# Patient Record
Sex: MALE | Race: WHITE | NOT HISPANIC OR LATINO | Employment: FULL TIME | URBAN - METROPOLITAN AREA
[De-identification: names, ages, dates, MRNs, and addresses within clinical notes are randomized per-mention and may not be internally consistent; named-entity substitution may affect disease eponyms.]

---

## 2023-10-31 ENCOUNTER — OFFICE VISIT (OUTPATIENT)
Dept: MEDICAL GROUP | Facility: LAB | Age: 43
End: 2023-10-31
Payer: COMMERCIAL

## 2023-10-31 VITALS
WEIGHT: 140.2 LBS | TEMPERATURE: 97.1 F | SYSTOLIC BLOOD PRESSURE: 104 MMHG | HEART RATE: 60 BPM | RESPIRATION RATE: 14 BRPM | DIASTOLIC BLOOD PRESSURE: 62 MMHG | BODY MASS INDEX: 18.99 KG/M2 | OXYGEN SATURATION: 98 % | HEIGHT: 72 IN

## 2023-10-31 DIAGNOSIS — M06.9 RHEUMATOID ARTHRITIS INVOLVING MULTIPLE SITES, UNSPECIFIED WHETHER RHEUMATOID FACTOR PRESENT (HCC): ICD-10-CM

## 2023-10-31 DIAGNOSIS — Z00.00 PREVENTATIVE HEALTH CARE: ICD-10-CM

## 2023-10-31 PROCEDURE — 99204 OFFICE O/P NEW MOD 45 MIN: CPT | Performed by: NURSE PRACTITIONER

## 2023-10-31 PROCEDURE — 3078F DIAST BP <80 MM HG: CPT | Performed by: NURSE PRACTITIONER

## 2023-10-31 PROCEDURE — 3074F SYST BP LT 130 MM HG: CPT | Performed by: NURSE PRACTITIONER

## 2023-10-31 ASSESSMENT — PATIENT HEALTH QUESTIONNAIRE - PHQ9: CLINICAL INTERPRETATION OF PHQ2 SCORE: 0

## 2023-10-31 NOTE — ASSESSMENT & PLAN NOTE
Patient has a history of RA, was followed by rheumatology in the past in [place]. He was previously on plaquenil, but has not been on this medication in about 10 years. RA has been well controlled, but recently noticed that he has started having flares of joint pain in ankles, fingers, shoulders, feet. Right ankle pain is the worst right now after an injury about 3 months ago. He would like labs and to re-establish with rheumatology.

## 2023-10-31 NOTE — PROGRESS NOTES
Subjective:     CC:    Chief Complaint   Patient presents with    Establish Care    Referral Needed     Rheumatologist      HISTORY OF THE PRESENT ILLNESS: Patient is a 43 y.o. male. This pleasant patient is here today to establish care and discuss the following:    Rheumatoid arthritis (HCC)  Patient has a history of RA, was followed by rheumatology in the past in [place]. He was previously on plaquenil, but has not been on this medication in about 10 years. RA has been well controlled, but recently noticed that he has started having flares of joint pain in ankles, fingers, shoulders, feet. Right ankle pain is the worst right now after an injury about 3 months ago. He would like labs and to re-establish with rheumatology.     ROS:   As documented in history of present illness above      Objective:     Exam: /62 (BP Location: Right arm, Patient Position: Sitting, BP Cuff Size: Adult)   Pulse 60   Temp 36.2 °C (97.1 °F)   Resp 14   Ht 1.829 m (6')   Wt 63.6 kg (140 lb 3.2 oz)   SpO2 98%  Body mass index is 19.01 kg/m².    Constitutional: Alert, no distress, well-groomed.  Skin: Warm, dry, good turgor, no rashes in visible areas.  Eye: Equal, round and reactive, conjunctiva clear, lids normal.  ENMT: Lips without lesions, good dentition, moist mucous membranes.  Neck: Trachea midline, no masses, no thyromegaly.  Respiratory: Unlabored respiratory effort, no cough.  MSK: Normal gait, moves all extremities.  Neuro: Grossly non-focal.   Psych: Alert and oriented x3, normal affect and mood.    Assessment & Plan:   43 y.o. male with the following -    1. Rheumatoid arthritis involving multiple sites, unspecified whether rheumatoid factor present (HCC)  Chronic, uncontrolled condition. X-rays and labs ordered. Will place referral when labs/imaging return. Discussed additional imaging for ankle in the future if pain persists/worsens.   - RHEUMATOID ARTHRITIS PANEL; Future  - DX-JOINT SURVEY-HANDS SINGLE VIEW;  Future  - DX-JOINT SURVEY-FEET SINGLE VIEW; Future  - DX-ANKLE 3+ VIEWS RIGHT; Future  - CRP QUANTITIVE (NON-CARDIAC); Future  - Sed Rate; Future    2. Preventative health care  Labs ordered.   - CBC WITH DIFFERENTIAL; Future  - Comp Metabolic Panel; Future  - Lipid Profile; Future  - HEMOGLOBIN A1C; Future  - TSH WITH REFLEX TO FT4; Future    Please note that this dictation was created using voice recognition software. I have made every reasonable attempt to correct obvious errors, but I expect that there are errors of grammar and possibly content that I did not discover before finalizing the note.

## 2023-11-14 ENCOUNTER — APPOINTMENT (OUTPATIENT)
Dept: RADIOLOGY | Facility: MEDICAL CENTER | Age: 43
End: 2023-11-14
Attending: NURSE PRACTITIONER
Payer: COMMERCIAL

## 2023-11-14 DIAGNOSIS — M06.9 RHEUMATOID ARTHRITIS INVOLVING MULTIPLE SITES, UNSPECIFIED WHETHER RHEUMATOID FACTOR PRESENT (HCC): ICD-10-CM

## 2023-11-14 PROCEDURE — 73610 X-RAY EXAM OF ANKLE: CPT | Mod: RT

## 2023-11-14 PROCEDURE — 77077 JOINT SURVEY SINGLE VIEW: CPT

## 2023-11-16 ENCOUNTER — TELEPHONE (OUTPATIENT)
Dept: SCHEDULING | Facility: IMAGING CENTER | Age: 43
End: 2023-11-16
Payer: COMMERCIAL

## 2023-11-20 ENCOUNTER — HOSPITAL ENCOUNTER (OUTPATIENT)
Dept: LAB | Facility: MEDICAL CENTER | Age: 43
End: 2023-11-20
Attending: NURSE PRACTITIONER
Payer: COMMERCIAL

## 2023-11-20 DIAGNOSIS — M06.9 RHEUMATOID ARTHRITIS INVOLVING MULTIPLE SITES, UNSPECIFIED WHETHER RHEUMATOID FACTOR PRESENT (HCC): ICD-10-CM

## 2023-11-20 DIAGNOSIS — Z00.00 PREVENTATIVE HEALTH CARE: ICD-10-CM

## 2023-11-20 LAB
ALBUMIN SERPL BCP-MCNC: 4.3 G/DL (ref 3.2–4.9)
ALBUMIN/GLOB SERPL: 1.5 G/DL
ALP SERPL-CCNC: 65 U/L (ref 30–99)
ALT SERPL-CCNC: 14 U/L (ref 2–50)
ANION GAP SERPL CALC-SCNC: 10 MMOL/L (ref 7–16)
AST SERPL-CCNC: 18 U/L (ref 12–45)
BASOPHILS # BLD AUTO: 0.3 % (ref 0–1.8)
BASOPHILS # BLD: 0.03 K/UL (ref 0–0.12)
BILIRUB SERPL-MCNC: 0.5 MG/DL (ref 0.1–1.5)
BUN SERPL-MCNC: 15 MG/DL (ref 8–22)
CALCIUM ALBUM COR SERPL-MCNC: 9.1 MG/DL (ref 8.5–10.5)
CALCIUM SERPL-MCNC: 9.3 MG/DL (ref 8.5–10.5)
CHLORIDE SERPL-SCNC: 106 MMOL/L (ref 96–112)
CHOLEST SERPL-MCNC: 156 MG/DL (ref 100–199)
CO2 SERPL-SCNC: 25 MMOL/L (ref 20–33)
CREAT SERPL-MCNC: 0.82 MG/DL (ref 0.5–1.4)
CRP SERPL HS-MCNC: <0.3 MG/DL (ref 0–0.75)
EOSINOPHIL # BLD AUTO: 0.08 K/UL (ref 0–0.51)
EOSINOPHIL NFR BLD: 0.9 % (ref 0–6.9)
ERYTHROCYTE [DISTWIDTH] IN BLOOD BY AUTOMATED COUNT: 40.3 FL (ref 35.9–50)
ERYTHROCYTE [SEDIMENTATION RATE] IN BLOOD BY WESTERGREN METHOD: 5 MM/HOUR (ref 0–20)
EST. AVERAGE GLUCOSE BLD GHB EST-MCNC: 103 MG/DL
FASTING STATUS PATIENT QL REPORTED: NORMAL
GFR SERPLBLD CREATININE-BSD FMLA CKD-EPI: 113 ML/MIN/1.73 M 2
GLOBULIN SER CALC-MCNC: 2.9 G/DL (ref 1.9–3.5)
GLUCOSE SERPL-MCNC: 91 MG/DL (ref 65–99)
HBA1C MFR BLD: 5.2 % (ref 4–5.6)
HCT VFR BLD AUTO: 48.5 % (ref 42–52)
HDLC SERPL-MCNC: 51 MG/DL
HGB BLD-MCNC: 17.3 G/DL (ref 14–18)
IMM GRANULOCYTES # BLD AUTO: 0.03 K/UL (ref 0–0.11)
IMM GRANULOCYTES NFR BLD AUTO: 0.3 % (ref 0–0.9)
LDLC SERPL CALC-MCNC: 86 MG/DL
LYMPHOCYTES # BLD AUTO: 1.58 K/UL (ref 1–4.8)
LYMPHOCYTES NFR BLD: 17.6 % (ref 22–41)
MCH RBC QN AUTO: 31.7 PG (ref 27–33)
MCHC RBC AUTO-ENTMCNC: 35.7 G/DL (ref 32.3–36.5)
MCV RBC AUTO: 88.8 FL (ref 81.4–97.8)
MONOCYTES # BLD AUTO: 0.71 K/UL (ref 0–0.85)
MONOCYTES NFR BLD AUTO: 7.9 % (ref 0–13.4)
NEUTROPHILS # BLD AUTO: 6.54 K/UL (ref 1.82–7.42)
NEUTROPHILS NFR BLD: 73 % (ref 44–72)
NRBC # BLD AUTO: 0 K/UL
NRBC BLD-RTO: 0 /100 WBC (ref 0–0.2)
PLATELET # BLD AUTO: 187 K/UL (ref 164–446)
PMV BLD AUTO: 12.6 FL (ref 9–12.9)
POTASSIUM SERPL-SCNC: 3.7 MMOL/L (ref 3.6–5.5)
PROT SERPL-MCNC: 7.2 G/DL (ref 6–8.2)
RBC # BLD AUTO: 5.46 M/UL (ref 4.7–6.1)
SODIUM SERPL-SCNC: 141 MMOL/L (ref 135–145)
TRIGL SERPL-MCNC: 96 MG/DL (ref 0–149)
TSH SERPL DL<=0.005 MIU/L-ACNC: 4.75 UIU/ML (ref 0.38–5.33)
WBC # BLD AUTO: 9 K/UL (ref 4.8–10.8)

## 2023-11-20 PROCEDURE — 36415 COLL VENOUS BLD VENIPUNCTURE: CPT

## 2023-11-20 PROCEDURE — 83036 HEMOGLOBIN GLYCOSYLATED A1C: CPT

## 2023-11-20 PROCEDURE — 80053 COMPREHEN METABOLIC PANEL: CPT

## 2023-11-20 PROCEDURE — 86200 CCP ANTIBODY: CPT

## 2023-11-20 PROCEDURE — 84443 ASSAY THYROID STIM HORMONE: CPT

## 2023-11-20 PROCEDURE — 86431 RHEUMATOID FACTOR QUANT: CPT

## 2023-11-20 PROCEDURE — 86140 C-REACTIVE PROTEIN: CPT

## 2023-11-20 PROCEDURE — 85025 COMPLETE CBC W/AUTO DIFF WBC: CPT

## 2023-11-20 PROCEDURE — 80061 LIPID PANEL: CPT

## 2023-11-20 PROCEDURE — 83516 IMMUNOASSAY NONANTIBODY: CPT

## 2023-11-20 PROCEDURE — 85652 RBC SED RATE AUTOMATED: CPT

## 2023-11-23 LAB
CARBAMYLATED PROTEIN ANTIBODY, IGG Q6043: 8 UNITS (ref 0–19)
CCP IGA+IGG SERPL IA-ACNC: 127 UNITS (ref 0–19)
RHEUMATOID FACT SER NEPH-ACNC: 55 IU/ML (ref 0–14)

## 2023-11-27 DIAGNOSIS — M06.9 RHEUMATOID ARTHRITIS INVOLVING MULTIPLE SITES, UNSPECIFIED WHETHER RHEUMATOID FACTOR PRESENT (HCC): ICD-10-CM

## 2024-01-15 ASSESSMENT — RHEUMATOLOGY NEW PATIENT QUESTIONNAIRE
JOINT PAIN: BETTER WITH ACTIVITY
LIST CURRENT PROBLEMS: RA
DURATION: >1 HOUR
JOINT SWELLING: Y
MARK ALL THE AREAS OF PAIN: 95816048
CHARACTERISTIC: BETTER WITH ACTIVITY
RATE_1TO10: 6

## 2024-01-18 ENCOUNTER — OFFICE VISIT (OUTPATIENT)
Dept: RHEUMATOLOGY | Facility: MEDICAL CENTER | Age: 44
End: 2024-01-18
Attending: STUDENT IN AN ORGANIZED HEALTH CARE EDUCATION/TRAINING PROGRAM
Payer: COMMERCIAL

## 2024-01-18 VITALS
WEIGHT: 143 LBS | DIASTOLIC BLOOD PRESSURE: 84 MMHG | HEART RATE: 50 BPM | OXYGEN SATURATION: 99 % | SYSTOLIC BLOOD PRESSURE: 110 MMHG | TEMPERATURE: 98.2 F | BODY MASS INDEX: 20.02 KG/M2 | HEIGHT: 71 IN

## 2024-01-18 DIAGNOSIS — Z79.899 LONG-TERM USE OF HYDROXYCHLOROQUINE: ICD-10-CM

## 2024-01-18 DIAGNOSIS — M25.50 ARTHRALGIA OF MULTIPLE JOINTS: ICD-10-CM

## 2024-01-18 DIAGNOSIS — M05.9 SEROPOSITIVE RHEUMATOID ARTHRITIS (HCC): ICD-10-CM

## 2024-01-18 PROBLEM — D84.9 IMMUNOSUPPRESSED STATUS (HCC): Status: ACTIVE | Noted: 2024-01-18

## 2024-01-18 PROCEDURE — 3074F SYST BP LT 130 MM HG: CPT | Performed by: STUDENT IN AN ORGANIZED HEALTH CARE EDUCATION/TRAINING PROGRAM

## 2024-01-18 PROCEDURE — 99214 OFFICE O/P EST MOD 30 MIN: CPT | Performed by: STUDENT IN AN ORGANIZED HEALTH CARE EDUCATION/TRAINING PROGRAM

## 2024-01-18 PROCEDURE — 99211 OFF/OP EST MAY X REQ PHY/QHP: CPT | Performed by: STUDENT IN AN ORGANIZED HEALTH CARE EDUCATION/TRAINING PROGRAM

## 2024-01-18 PROCEDURE — 3079F DIAST BP 80-89 MM HG: CPT | Performed by: STUDENT IN AN ORGANIZED HEALTH CARE EDUCATION/TRAINING PROGRAM

## 2024-01-18 RX ORDER — HYDROXYCHLOROQUINE SULFATE 200 MG/1
200 TABLET, FILM COATED ORAL DAILY
Qty: 90 TABLET | Refills: 3 | Status: SHIPPED | OUTPATIENT
Start: 2024-01-18

## 2024-01-18 RX ORDER — COVID-19 ANTIGEN TEST
220 KIT MISCELLANEOUS
COMMUNITY

## 2024-01-18 ASSESSMENT — PATIENT HEALTH QUESTIONNAIRE - PHQ9: CLINICAL INTERPRETATION OF PHQ2 SCORE: 0

## 2024-01-18 ASSESSMENT — FIBROSIS 4 INDEX: FIB4 SCORE: 1.08

## 2024-01-18 NOTE — PROGRESS NOTES
[facility]  [address], [address], [place] [address]  Phone: [phone] ? Fax: [phone]  [facility]/Health-Services/Rheumatology    NEW PATIENT VISIT NOTE      DATE OF SERVICE: 01/18/2024         Subjective     REFERRING PRACTITIONER:  TRUPTI Obando  [address]  [place] [id number]    PATIENT IDENTIFICATION:  Maye Caraballo  [address] [place] [address]    YOB: 1980    MEDICAL RECORD NUMBER: [id number]         CHIEF COMPLAINT:   Chief Complaint   Patient presents with    New Patient     Rheumatoid arthritis       HISTORY OF PRESENT ILLNESS:  Maye Caraballo is a 44 y.o. male with pertinent history notable for seropositive rheumatoid arthritis diagnosed around late 2000's by a rheumatologist in [place] where he used to live. He presents to establish care for continued evaluation and management of his condition in light of recent symptoms. He had been treated with hydroxychloroquine for years until he self-discontinued it when him and his wife were trying to start a family. He had been doing okay until summer 2023 when he started developing joint pain/swelling mostly in his hands (MCP, PIP, DIP joints) and shoulders, but also his right ankle and right knee (attributes this to overuse), and over 1 hour of morning stiffness that improves with activity. He has been managing with naproxen as needed which does provide some relief. Reports other aspects of his symptoms and medical history as noted on the questionnaire below.    Pertinent treatments: Naproxen 220 mg PRN (helpful), hydroxychloroquine 200 mg daily (self-discontinued after years, restarted 1/18/24-present).    Pertinent positive labs: Positive anti- and RF 55 (11/2023).    Pertinent negative labs: Negative CRP, ESR, TSH, eGFR, creatinine, LFTs, and CBC (in 11/2023).    Pertinent XR imaging (in 11/2023): Hands, feet, and ankles with no evidence of inflammatory or degenerative  arthropathy.    [facility] New Patient History Form    1/15/2024 12:49 PM PST - Filed by Patient   Demographic Information   Marital Status:    Occupation: Physician   Highest Level of Education: Doctorate   MAIN REASON FOR VISIT Discuss RA treatments   HISTORY OF PRESENT ILLNESS   Date of symptom onset: Over 15 years ago, started again 6 months ago   Preceding incident/ailment: Unknown   Describe/list your symptoms: Swelling and stiffness primarily in hands and shoulders   Exacerbating factors: Poor sleep and every morning   Alleviating factors: NSAIDS, exercise   Helpful medications: Naproxen sodium   Ineffective medications:    Severity of pain (scale of 1-10): 6   Personal/emotional stressors:    Jb All The Areas Of Pain    REVIEW OF SYMPTOMS    General   Fevers    Chills    Night sweats    Malaise    Fatigue    Unintentional weight loss    Musculoskeletal   Joint pain Better with activity   Morning stiffness duration >1 hour   Morning stiffness characteristic Better with activity   Joint swelling Yes   Joint instability    Tendon pain    Muscle pain    Body aches    Dermatologic   Hair loss with bald spots    Hair shedding    Skin thickening    Skin plaques    Sunlight-induced skin rash    Cold-induced color changes (white, purple, red on rewarming)    Neurologic/Psychiatric   Weakness    Spasms    Tingling    Burning    Numbness    Insomnia    Anxiety    Depression    Head/Eyes   Headaches    Temple pain    Dizziness    Dry eyes    Eye pain    Eye redness    Blurry vision    Vision loss    Ears/Nose   Ear pain    Ringing in ears    Vertigo    Hearing loss    Nasal ulcers    Nosebleeds    Sinus pain    Nasal congestion    Snoring    Mouth/Throat   Oral ulcers    Bleeding gums    Dry mouth    Cavities    Sore throat    Sticking in throat    Difficulty speaking    Neck/Lymphatics   Thyroid pain    Thyroid swelling    Lymph node swelling    Cardiac/Respiratory   Chest pain with breathing    Dry  cough    Cough with bloody phlegm    Shortness of breath    Fast heartbeats    Irregular heartbeats    Gastrointestinal   Nausea    Vomiting    Difficulty swallowing    Heartburn    Abdominal pain    Bloody stool    Mucus stool    Genitourinary   Pelvic pain    Genital ulcers    Abnormal discharge    Burning urination    Frothy urine    Blood in urine    MEDICAL/PERSONAL HISTORY DETAILS   Current Medical Problems: RA   Past/Resolved Medical Problems:    Surgeries/Procedures (include month/year): None   Prescription/Over-The-Counter/Herbal Medications: Tumeric, B complex vitamins, vit D   Medication/Food/Material Allergies (include reactions):    Immunizations (include month/year) Up to date   Alcohol/Tobacco/Recreational Drugs: None   Family Medical History (father, mother, siblings only):      REVIEW OF SYSTEMS:  Except as noted in the history above, relevant review of systems with emphasis on autoimmune rheumatic diseases was otherwise negative.      ACTIVE PROBLEM LIST:  Patient Active Problem List    Diagnosis Date Noted    Arthralgia of multiple joints 01/18/2024    Long-term use of hydroxychloroquine 01/18/2024    Seropositive rheumatoid arthritis (HCC) 10/31/2023       PAST MEDICAL HISTORY:  History reviewed. No pertinent past medical history.    PAST SURGICAL HISTORY:  History reviewed. No pertinent surgical history.    MEDICATIONS:  Current Outpatient Medications   Medication Sig    Naproxen Sodium 220 MG Cap Take 220 mg by mouth.    Cholecalciferol (VITAMIN D3) 50 MCG (2000 UT) Chew Tab Chew.    B Complex Vitamins (VITAMIN B COMPLEX PO) Take  by mouth.    TURMERIC PO Take  by mouth.    hydroxychloroquine (PLAQUENIL) 200 MG Tab Take 1 Tablet by mouth every day.       ALLERGIES:   No Known Allergies    IMMUNIZATIONS:  Immunization History   Administered Date(s) Administered    MODERNA SARS-COV-2 VACCINE (12+) 08/17/2021, 09/14/2021       SOCIAL HISTORY:   Social History     Socioeconomic History    Marital  "status:    Tobacco Use    Smoking status: Never    Smokeless tobacco: Never   Substance and Sexual Activity    Alcohol use: Not Currently    Drug use: Not Currently    Sexual activity: Yes     Partners: Female       FAMILY HISTORY:  History reviewed. No pertinent family history.         Objective     Vital Signs: /84 (BP Location: Left arm, Patient Position: Sitting, BP Cuff Size: Adult)   Pulse (!) 50   Temp 36.8 °C (98.2 °F) (Temporal)   Ht 1.803 m (5' 11\")   Wt 64.9 kg (143 lb)   SpO2 99% Body mass index is 19.94 kg/m².    General: Appears well and comfortable  Eyes: No scleral or conjunctival lesions  ENT: No apparent oral or nasal lesions  Head/Neck: No apparent scalp or neck lesions  Cardiovascular: Regular rate and rhythm; no pericardial rubs  Respiratory: Breathing quiet and unlabored; no rales or pleural rubs  Gastrointestinal: No apparent organomegaly or abdominal masses  Integumentary: No significant cutaneous lesions or dyspigmentation  Musculoskeletal: Poorly localized minimal tenderness of hands and shoulders; no periarticular soft tissue swelling, warmth, erythema, or overt synovitis; no significant restriction in range of motion of joints examined  Neurologic: No focal sensory or motor deficits  Psychiatric: Mood and affect appropriate      LABORATORY RESULTS REVIEWED AND INTERPRETED BY ME:  Lab Results   Component Value Date/Time    CREACTPROT <0.30 11/20/2023 06:56 AM    SEDRATEWES 5 11/20/2023 06:56 AM     Lab Results   Component Value Date/Time    RHEUMFACTN 55 (H) 11/20/2023 06:56 AM     Lab Results   Component Value Date/Time    TSHULTRASEN 4.750 11/20/2023 06:56 AM     Lab Results   Component Value Date/Time    WBC 9.0 11/20/2023 06:56 AM    RBC 5.46 11/20/2023 06:56 AM    HEMOGLOBIN 17.3 11/20/2023 06:56 AM    HEMATOCRIT 48.5 11/20/2023 06:56 AM    MCV 88.8 11/20/2023 06:56 AM    MCH 31.7 11/20/2023 06:56 AM    MCHC 35.7 11/20/2023 06:56 AM    RDW 40.3 11/20/2023 06:56 AM "    PLATELETCT 187 11/20/2023 06:56 AM    MPV 12.6 11/20/2023 06:56 AM    NEUTS 6.54 11/20/2023 06:56 AM    LYMPHOCYTES 17.60 (L) 11/20/2023 06:56 AM    MONOCYTES 7.90 11/20/2023 06:56 AM    EOSINOPHILS 0.90 11/20/2023 06:56 AM    BASOPHILS 0.30 11/20/2023 06:56 AM     Lab Results   Component Value Date/Time    ASTSGOT 18 11/20/2023 06:56 AM    ALTSGPT 14 11/20/2023 06:56 AM    ALKPHOSPHAT 65 11/20/2023 06:56 AM    TBILIRUBIN 0.5 11/20/2023 06:56 AM    TOTPROTEIN 7.2 11/20/2023 06:56 AM    ALBUMIN 4.3 11/20/2023 06:56 AM     Lab Results   Component Value Date/Time    SODIUM 141 11/20/2023 06:56 AM    POTASSIUM 3.7 11/20/2023 06:56 AM    CHLORIDE 106 11/20/2023 06:56 AM    CO2 25 11/20/2023 06:56 AM    GLUCOSE 91 11/20/2023 06:56 AM    BUN 15 11/20/2023 06:56 AM    CREATININE 0.82 11/20/2023 06:56 AM    CALCIUM 9.3 11/20/2023 06:56 AM     Lab Results   Component Value Date/Time    CHOLSTRLTOT 156 11/20/2023 06:56 AM    LDL 86 11/20/2023 06:56 AM    HDL 51 11/20/2023 06:56 AM    TRIGLYCERIDE 96 11/20/2023 06:56 AM    HBA1C 5.2 11/20/2023 06:56 AM       RADIOLOGY RESULTS REVIEWED AND INTERPRETED BY ME:  Results for orders placed in visit on 11/14/23    DX-ANKLE 3+ VIEWS RIGHT    Impression  No acute processes.    Results for orders placed in visit on 11/14/23    DX-JOINT SURVEY-FEET SINGLE VIEW    Impression  Normal exam.    Results for orders placed in visit on 11/14/23    DX-JOINT SURVEY-HANDS SINGLE VIEW    Impression  Normal exam.      All relevant laboratory and imaging results reported on this note were reviewed and interpreted by me.         Assessment & Plan     Maye Caraballo is a 44 y.o. male with history as noted above whose presentation merits the following clinical impressions and recommendations:    1. Seropositive rheumatoid arthritis (HCC)  Clinically relatively active on the current regimen of naproxen which is not disease modifying. Notably, the presence of strongly positive anti-CCP in addition to  RF is associated with risk of more aggressive disease with joint erosions and extra-articular involvement over the long-term if not adequately treated. To prevent further disease evolution and progression, need to reinitiate immunomodulatory therapy with hydroxychloroquine.   - CRP QUANTITIVE (NON-CARDIAC); Future  - Sed Rate; Future  - hydroxychloroquine (PLAQUENIL) 200 MG Tab; Take 1 Tablet by mouth every day.  Dispense: 90 Tablet; Refill: 3    2. Arthralgia of multiple joints  Predominantly inflammatory arthralgia secondary to his RA, but also with some component of biomechanical arthralgia of his right ankle and right knee related to his physical activity.  - CRP QUANTITIVE (NON-CARDIAC); Future  - Sed Rate; Future    3. Long-term use of hydroxychloroquine  Minimal to no risk of retinal toxicity given the low dose of hydroxychloroquine prescribed (less than 5 mg/kg of body weight) per rheumatology and ophthalmology guidelines. However, ophthalmologic evaluation is still recommended with the frequency of routine follow-up eye exams determined by the ophthalmologist, typically annually or every other year.  - CBC WITH DIFFERENTIAL; Future  - Comp Metabolic Panel; Future  - Routine ophthalmology evaluation as recommended      The above assessment and plan were discussed with the patient who acknowledged understanding of the plan.    FOLLOW-UP: Return in about 3 months (around 4/18/2024) for Short.         Thank you for the opportunity to participate in the care of Maye Caraballo.    Marty Ramachandran MD, MS, FACR  Rheumatologist, [facility] ? [facility]   of Clinical Medicine, Department of Internal Medicine  Critical access hospital ? [facility]

## 2024-01-19 NOTE — PATIENT INSTRUCTIONS
AFTER VISIT INSTRUCTIONS    Below are important information to help you navigate your healthcare needs and help us serve you safely and effectively:  If laboratory tests and/or imaging studies were ordered, remember to go get them done as instructed.  If new prescriptions and/or refills were sent, remember to go pick them up from your local pharmacy, or call the specialty pharmacy to request shipment.  Always take your prescription medications exactly as prescribed unless instructed otherwise.  Note that antirheumatic drugs and steroids are immunosuppressive which means they increase your risk of infections and have multiple potential adverse effects on various organ systems in your body, though most of them are uncommon.  It is important that you are up-to-date on age-appropriate immunizations, particularly shingles and bacterial/viral pneumonia vaccines, which you can request from me or your primary care provider.  Be sure to read the drug package inserts to learn about the potential side effects of your medications before you start taking them.  If you experience any significant drug side effects, stop taking the medication and notify me promptly, and depending on the severity of the side effects, consider going to an urgent care or emergency department for immediate attention.  If there are significant findings on your lab tests and imaging studies that warrant further action, I will notify you with explanations via [employer] or phone call, otherwise you can view them on [employer] and let me know if you have any questions.  Note that [employer] messages are typically read during office hours and may take 1-7 business days before a response depending on the urgency of the situation and how busy my clinic schedule is.  In general, [employer] messaging is for non-urgent matters that do not require immediate attention, so for urgent matters that cannot wait, you are advised to go to an urgent care.  Lastly, you are granted  Stephan[employer] access to my documentation of your visit and are encouraged to read my note which details my assessment and plan for your condition.  To learn more about your condition and rheumatic diseases evaluated and treated by rheumatologists, as well as gain access to many helpful resources about these diseases, visit our website at [email or web address].

## 2024-03-22 ENCOUNTER — HOSPITAL ENCOUNTER (OUTPATIENT)
Dept: LAB | Facility: MEDICAL CENTER | Age: 44
End: 2024-03-22
Attending: PAIN MEDICINE
Payer: COMMERCIAL

## 2024-03-22 ENCOUNTER — HOSPITAL ENCOUNTER (OUTPATIENT)
Dept: CARDIOLOGY | Facility: MEDICAL CENTER | Age: 44
End: 2024-03-22
Attending: PAIN MEDICINE
Payer: COMMERCIAL

## 2024-03-22 LAB
BASOPHILS # BLD AUTO: 0.7 % (ref 0–1.8)
BASOPHILS # BLD: 0.04 K/UL (ref 0–0.12)
CRP SERPL HS-MCNC: 0.42 MG/DL (ref 0–0.75)
EKG IMPRESSION: NORMAL
EOSINOPHIL # BLD AUTO: 0.14 K/UL (ref 0–0.51)
EOSINOPHIL NFR BLD: 2.3 % (ref 0–6.9)
ERYTHROCYTE [DISTWIDTH] IN BLOOD BY AUTOMATED COUNT: 39.5 FL (ref 35.9–50)
ERYTHROCYTE [SEDIMENTATION RATE] IN BLOOD BY WESTERGREN METHOD: 4 MM/HOUR (ref 0–20)
HCT VFR BLD AUTO: 47.3 % (ref 42–52)
HGB BLD-MCNC: 15.9 G/DL (ref 14–18)
IMM GRANULOCYTES # BLD AUTO: 0.01 K/UL (ref 0–0.11)
IMM GRANULOCYTES NFR BLD AUTO: 0.2 % (ref 0–0.9)
LYMPHOCYTES # BLD AUTO: 1.44 K/UL (ref 1–4.8)
LYMPHOCYTES NFR BLD: 23.9 % (ref 22–41)
MCH RBC QN AUTO: 29.9 PG (ref 27–33)
MCHC RBC AUTO-ENTMCNC: 33.6 G/DL (ref 32.3–36.5)
MCV RBC AUTO: 89.1 FL (ref 81.4–97.8)
MONOCYTES # BLD AUTO: 0.45 K/UL (ref 0–0.85)
MONOCYTES NFR BLD AUTO: 7.5 % (ref 0–13.4)
NEUTROPHILS # BLD AUTO: 3.95 K/UL (ref 1.82–7.42)
NEUTROPHILS NFR BLD: 65.4 % (ref 44–72)
NRBC # BLD AUTO: 0 K/UL
NRBC BLD-RTO: 0 /100 WBC (ref 0–0.2)
PLATELET # BLD AUTO: 187 K/UL (ref 164–446)
PMV BLD AUTO: 9.5 FL (ref 9–12.9)
RBC # BLD AUTO: 5.31 M/UL (ref 4.7–6.1)
TROPONIN T SERPL-MCNC: 9 NG/L (ref 6–19)
WBC # BLD AUTO: 6 K/UL (ref 4.8–10.8)

## 2024-03-22 PROCEDURE — 84484 ASSAY OF TROPONIN QUANT: CPT

## 2024-03-22 PROCEDURE — 36415 COLL VENOUS BLD VENIPUNCTURE: CPT

## 2024-03-22 PROCEDURE — 85652 RBC SED RATE AUTOMATED: CPT

## 2024-03-22 PROCEDURE — 93005 ELECTROCARDIOGRAM TRACING: CPT | Performed by: PAIN MEDICINE

## 2024-03-22 PROCEDURE — 86140 C-REACTIVE PROTEIN: CPT

## 2024-03-22 PROCEDURE — 85025 COMPLETE CBC W/AUTO DIFF WBC: CPT

## 2024-03-22 PROCEDURE — 93010 ELECTROCARDIOGRAM REPORT: CPT | Performed by: INTERNAL MEDICINE

## 2024-03-24 LAB — CK MB SERPL-MCNC: 3 NG/ML (ref 0–7.7)

## 2024-12-04 ENCOUNTER — PATIENT MESSAGE (OUTPATIENT)
Dept: MEDICAL GROUP | Facility: LAB | Age: 44
End: 2024-12-04
Payer: COMMERCIAL

## 2024-12-04 DIAGNOSIS — R07.9 CHEST PAIN ON EXERTION: ICD-10-CM

## 2024-12-04 DIAGNOSIS — R07.89 CHEST TIGHTNESS: ICD-10-CM

## 2024-12-13 ENCOUNTER — TELEPHONE (OUTPATIENT)
Dept: HEALTH INFORMATION MANAGEMENT | Facility: OTHER | Age: 44
End: 2024-12-13
Payer: COMMERCIAL